# Patient Record
Sex: MALE | URBAN - NONMETROPOLITAN AREA
[De-identification: names, ages, dates, MRNs, and addresses within clinical notes are randomized per-mention and may not be internally consistent; named-entity substitution may affect disease eponyms.]

---

## 2023-03-23 ENCOUNTER — APPOINTMENT (RX ONLY)
Dept: URBAN - NONMETROPOLITAN AREA CLINIC 25 | Facility: CLINIC | Age: 58
Setting detail: DERMATOLOGY
End: 2023-03-23

## 2023-03-23 DIAGNOSIS — L72.0 EPIDERMAL CYST: ICD-10-CM | Status: INADEQUATELY CONTROLLED

## 2023-03-23 PROCEDURE — 99202 OFFICE O/P NEW SF 15 MIN: CPT

## 2023-03-23 PROCEDURE — ? COUNSELING

## 2023-03-23 PROCEDURE — ? REFERRAL

## 2023-03-23 PROCEDURE — ? ADDITIONAL NOTES

## 2023-03-23 ASSESSMENT — LOCATION SIMPLE DESCRIPTION DERM: LOCATION SIMPLE: POSTERIOR NECK

## 2023-03-23 ASSESSMENT — LOCATION DETAILED DESCRIPTION DERM: LOCATION DETAILED: RIGHT LATERAL NECK

## 2023-03-23 ASSESSMENT — LOCATION ZONE DERM: LOCATION ZONE: NECK

## 2023-03-23 NOTE — PROCEDURE: ADDITIONAL NOTES
Additional Notes: Size is 4.7cm X 3.5cm. Explained to patient that due to the size of the lesion local anesthesia would be insufficient on controlling pain during the procedure. Discussed  Referral to plastic surgery is needed due to size of EIC, Patient refuses to see any providers at [facility] due to a negative previous experience. Discussed with patient that due to the location of the EIC, plastics would have to be seen instead of general surgery. Patient verbalized understanding. Will make referral to plastic surgery (Dr. Yuri Guaman) for patient to be seen for evaluation.
Render Risk Assessment In Note?: no
Detail Level: Simple